# Patient Record
Sex: FEMALE | Race: WHITE | NOT HISPANIC OR LATINO | URBAN - METROPOLITAN AREA
[De-identification: names, ages, dates, MRNs, and addresses within clinical notes are randomized per-mention and may not be internally consistent; named-entity substitution may affect disease eponyms.]

---

## 2018-10-24 PROCEDURE — 88305 TISSUE EXAM BY PATHOLOGIST: CPT | Performed by: PATHOLOGY

## 2018-10-25 ENCOUNTER — LAB REQUISITION (OUTPATIENT)
Dept: LAB | Facility: HOSPITAL | Age: 33
End: 2018-10-25
Payer: COMMERCIAL

## 2018-10-25 DIAGNOSIS — N93.8 OTHER SPECIFIED ABNORMAL UTERINE AND VAGINAL BLEEDING: ICD-10-CM

## 2021-11-04 ENCOUNTER — APPOINTMENT (OUTPATIENT)
Dept: URGENT CARE | Facility: CLINIC | Age: 36
End: 2021-11-04

## 2021-11-04 DIAGNOSIS — Z02.1 PHYSICAL EXAM, PRE-EMPLOYMENT: ICD-10-CM

## 2021-11-04 PROCEDURE — 86480 TB TEST CELL IMMUN MEASURE: CPT

## 2021-11-04 PROCEDURE — 86787 VARICELLA-ZOSTER ANTIBODY: CPT

## 2021-11-05 LAB
GAMMA INTERFERON BACKGROUND BLD IA-ACNC: 0.05 IU/ML
M TB IFN-G BLD-IMP: NEGATIVE
M TB IFN-G CD4+ BCKGRND COR BLD-ACNC: -0.01 IU/ML
M TB IFN-G CD4+ BCKGRND COR BLD-ACNC: -0.01 IU/ML
MITOGEN IGNF BCKGRD COR BLD-ACNC: >10 IU/ML

## 2021-11-08 LAB — VZV IGG SER IA-ACNC: NORMAL

## 2023-12-15 ENCOUNTER — COSMETIC (OUTPATIENT)
Dept: PLASTIC SURGERY | Facility: CLINIC | Age: 38
End: 2023-12-15

## 2023-12-15 DIAGNOSIS — Z41.1 ENCOUNTER FOR COSMETIC PROCEDURE: Primary | ICD-10-CM

## 2023-12-15 PROCEDURE — BOTOX1U PR BOTOX BY THE UNIT: Performed by: STUDENT IN AN ORGANIZED HEALTH CARE EDUCATION/TRAINING PROGRAM

## 2023-12-15 PROCEDURE — BOTOX1 ONE AREA OR 25 UNITS: Performed by: STUDENT IN AN ORGANIZED HEALTH CARE EDUCATION/TRAINING PROGRAM

## 2023-12-15 NOTE — PROGRESS NOTES
Botox Consult     First time?: no, had with OSH provider  Allergies: no  Blood thinners: no   Pregnant: no  Neuromuscular conditions: no    Patient has had botox, interested in relaxing orbicularis oris muscle for smokers lines. Will proceed with 4 units of botox     Risks and benefits discussed, patient agreed to proceed.      4 units to superior orbicularis oris muscle     Total 4 units, 30% off employee     Patient tolerated well, f/u in 3 months        Javy Garrido MD   [address] [facility]   [facility], [facility] [address]   Office: [phone]

## 2023-12-20 DIAGNOSIS — Z00.6 ENCOUNTER FOR EXAMINATION FOR NORMAL COMPARISON OR CONTROL IN CLINICAL RESEARCH PROGRAM: ICD-10-CM

## 2023-12-21 ENCOUNTER — APPOINTMENT (OUTPATIENT)
Dept: LAB | Facility: CLINIC | Age: 38
End: 2023-12-21

## 2023-12-21 DIAGNOSIS — Z00.6 ENCOUNTER FOR EXAMINATION FOR NORMAL COMPARISON OR CONTROL IN CLINICAL RESEARCH PROGRAM: ICD-10-CM

## 2023-12-21 PROCEDURE — 36415 COLL VENOUS BLD VENIPUNCTURE: CPT

## 2024-02-12 LAB
APOB+LDLR+PCSK9 GENE MUT ANL BLD/T: NOT DETECTED
BRCA1+BRCA2 DEL+DUP + FULL MUT ANL BLD/T: NOT DETECTED
MLH1+MSH2+MSH6+PMS2 GN DEL+DUP+FUL M: NOT DETECTED

## 2024-10-16 ENCOUNTER — EVALUATION (OUTPATIENT)
Dept: PHYSICAL THERAPY | Facility: CLINIC | Age: 39
End: 2024-10-16
Payer: COMMERCIAL

## 2024-10-16 DIAGNOSIS — M53.3 COCCYX PAIN: Primary | ICD-10-CM

## 2024-10-16 PROCEDURE — 97530 THERAPEUTIC ACTIVITIES: CPT | Performed by: PHYSICAL THERAPIST

## 2024-10-16 PROCEDURE — 97163 PT EVAL HIGH COMPLEX 45 MIN: CPT | Performed by: PHYSICAL THERAPIST

## 2024-10-16 NOTE — PROGRESS NOTES
PT Evaluation     Today's date: 10/16/2024  Patient name: Rimma Leyva  : 1985  MRN: [id number]  Referring provider: Batsheva Scott PA-C  Dx:   Encounter Diagnosis     ICD-10-CM    1. Coccyx pain  M53.3                      Assessment  Impairments: activity intolerance, impaired physical strength, lacks appropriate home exercise program, pain with function, poor body mechanics, unable to perform ADL, activity limitations and endurance  Symptom irritability: high  Understanding of Dx/Px/POC: good     Prognosis: good    Goals  (Up to 8 weeks)  1. Independent and safe with HEP.  2. Independent and safe with self-stretching tech.  3. Independent and safe with self-pain management.  4. PF MMT will increase by 1/2 grade t/o.    Plan  Patient would benefit from: skilled physical therapy    Planned therapy interventions: joint mobilization, manual therapy, massage, neuromuscular re-education, patient/caregiver education, postural training, strengthening, body mechanics training, behavior modification, activity modification, abdominal trunk stabilization, home exercise program, graded exercise, graded activity and flexibility    Frequency: 1x week  Duration in weeks: 8  Treatment plan discussed with: patient        PT Pelvic Floor Subjective:   History of Present Illness:   Pt is 40 y/o female with c/o coccyx pain since May 2024. She was seen by her pain management specialist, received 2 injections with no improvement in symptoms. (-) X-ray, (-) MRI. Also, CLBP, Hx of fall on ice in , (+) hysterectomy in .  Current functional limitations: pain with prolong sitting, pain with palpation over tailbone, some discomfort during the night as well, some discomfort with prolong amb and pain with sit to stand transfer.Date of onset: 2024          Recurrent probem    Quality of life: fair    Social Support:     Lives in:  Multiple-level home    Lives with:  Spouse and young children    Relationship status:  "/committed    Work status: employed full time (prolong sitting)    Life stress level: 8    Life stress severity: severe    History of Depression: noPronouns: she/her  Hand dominance:  Right  Diet and Exercise:    Diet:balanced nutrition    Exercise type: yoga and combination activity    Exercise frequency: 3-4 times per week  OB/ gyn History    Gestational History:     Prior Pregnancy: Yes      Number of prior pregnancies: 1    Number of term pregnancies: 1    Delivery Type:  section      Number of caesarian sections: 1 (, 8.3 lbs)    Delivery Complications:  Emergency .  no delivery complications    Menstrual History:    no hormone replacement therapy  Hx of hysterectomy in   Bladder Function:     Voiding Difficulties positive for: urgency and frequent urination      Voiding Difficulties negative for: incomplete emptying       Voiding Difficulties comments:     Voiding frequency: every 1-2 hours and every 3-4 hours    Urinary leakage: no urine leakage    Urinary leakage not aggravated by: post-void dribble    Nocturia (episodes per night): 0 and 1    Painful urination: No      Fluid Intake Type:  Water, coffee, tea, alcohol and soda    Intake (ounces): Water intake (oz): 32 oz. Coffee intake (oz): 16 oz. Soda intake (oz): 1 mini can per day. Tea intake (oz): on/off. Alcohol intake (oz): on/off.  Incontinence Management:     Pads/Diaper Use:  None  Bowel Function:     Voiding DIfficulties: unfinished feeling after defecating and constipation      Bowel Function comments:  Educated on postural education with home use of squatty potty to improve BM.    Bowel frequency: daily, every 2 days and every 3 days    Avery Stool Scale: type 5, type 6, type 4, type 1 and type 2    Stool softener use: stool softeners    Enema use: no enema    Uses \"squatty potty\": no Squatty Potty  Sexual Function:     Sexually Active:  Non-contributory  Pain:     Current pain ratin    At best pain rating:  " 3    At worst pain rating:  10    Onset:  4-6 months ago    Quality:  Knife-like and sharp    Aggravating factors:  Prolonged positions (in sitting)    Duration of symptoms:  Does not go away    Relieving factors:  Change in position    Progression:  No change  Diagnostic Tests:     X-ray: normal      MRI studies: normal    Treatments:     Previous treatment:  Medication    Current treatment: injection treatment    Patient Goals:     Patient goals for therapy:  Improved quality of life, improved sleep, improved pain management, decreased pain and improved comfort    Other patient goals:  To improve sitting without pain.      Objective     Postural Observations  Seated posture: poor  Standing posture: poor    Additional Postural Observation Details  (+) coccyx pillow use    (+) L-roll use    Active Range of Motion     Lumbar   Normal active range of motion    Strength/Myotome Testing     Lumbar   Left   Normal strength    Right   Normal strength    General Comments:      Lumbar Comments  (+) R shift of coccyx noted, (+) flexion palpated, (+) external and internal discomfort to palpation over coccyx and to soft tissue b/l to coccyx/deep PFM.      Abdominal Assessment:      Position: supine exam    Diastatis   Diastasis recti present? no  Connective tissue integrity at linea alba: firm  no tenderness at linea alba  able to engage transverse abdominis     Skin inspection:   no scars present.       General Perineum Exam:   perineum intact.     Visual Inspection of Perineum:   Excursion of perineal body in cephalad direction with contraction of pelvic floor muscles (PFM): fair   Excursion of perineal body in caudal direction with relaxation of pelvic floor muscles (PFM): good   Involuntary contraction with coughing: no  Involuntary relaxation with bearing down: no  Cotton swab test: non-tender  Cough reflex: cough reflex  Sphincter Tone Resting: normal  Sphincter Tone Squeeze: normal  Sensation: intact  Tenderness:  unprovoked    Pelvic Organ Prolapse   no pelvic organ prolapse  Perineal body inspection: within normal limits        Pelvic Floor Muscle Exam:      Breathing pattern with contraction: holding breath   Pelvic floor muscle relaxation is complete.         PERFECT Score   Power right: 3/5   Power left: 3/5   Endurance (seconds to max): 5   Repetitions (before fatigue): 5   Fast flicks (in 10 seconds): 5      Rectal Pelvic Floor Muscle Exam    External anal sphincter: wink reflex intact    pelvic floor exam consent given by patient    Pelvic exam completed: vaginally and rectally                Precautions: not any given, see pt's chart for details      Manuals 10/16            PF MMT 3/5 w good endurance            Internal external coccyx mobs                                       Neuro Re-Ed                                                                                                        Ther Ex             Sitting self coccyx stretch w use of tennis ball demo            U/l piriformis stretch demo                                                                                          Ther Activity             HEP edu/review 8'            Pelvic wand use/benefits/purpose 2' edu            Gait Training                                       Modalities

## 2024-10-16 NOTE — LETTER
2024      No Recipients    Patient: Rimma Leyva   YOB: 1985   Date of Visit: 10/16/2024     Encounter Diagnosis     ICD-10-CM    1. Coccyx pain  M53.3           Dear Dr. Scott:    Thank you for your recent referral of Rimma Leyva. Please review the attached evaluation summary from Rimma's recent visit.     Please verify that you agree with the plan of care by signing the attached order.     If you have any questions or concerns, please do not hesitate to call.     I sincerely appreciate the opportunity to share in the care of one of your patients and hope to have another opportunity to work with you in the near future.       Sincerely,    Keri Crespo, PT      Referring Provider:      I certify that I have read the below Plan of Care and certify the need for these services furnished under this plan of treatment while under my care.                    Batsheva Scott PA-C  [address] [place] [address]  [address] [place] [address]  Via Fax: [phone]          PT Evaluation     Today's date: 10/16/2024  Patient name: Rimma Leyva  : 1985  MRN: [id number]  Referring provider: Batsheva Scott PA-C  Dx:   Encounter Diagnosis     ICD-10-CM    1. Coccyx pain  M53.3                      Assessment/Plan    PT Pelvic Floor Subjective:   History of Present Illness:   Pt is 40 y/o female with c/o coccyx pain since May 2024. She was seen by her pain management specialist, received 2 injections with no improvement in symptoms. (-) X-ray, (-) MRI. Also, CLBP, Hx of fall on ice in   Current functional limitations: pain with prolong sitting, pain with palpation over tailbone, some discomfort during the night as well, some discomfort with prolong amb and pain with sit to stand transfer.    PMHx:  hysterectomy Date of onset: 2024          Recurrent probem    Quality of life: fair    Social Support:     Lives in:  Multiple-level home    Lives with:  Spouse and young children     "Relationship status: /committed    Work status: employed full time (prolong sitting)    Life stress level: 8    Life stress severity: severe    History of Depression: noPronouns: she/her  Hand dominance:  Right  Diet and Exercise:    Diet:balanced nutrition    Exercise type: yoga and combination activity    Exercise frequency: 3-4 times per week  OB/ gyn History    Gestational History:     Prior Pregnancy: Yes      Number of prior pregnancies: 1    Number of term pregnancies: 1    Delivery Type:  section      Number of caesarian sections: 1 (, 8.3 lbs)    Delivery Complications:  Emergency .  no delivery complications    Menstrual History:    no hormone replacement therapy  Hx of hysterectomy in   Bladder Function:     Voiding Difficulties positive for: urgency and frequent urination      Voiding Difficulties negative for: incomplete emptying       Voiding Difficulties comments:     Voiding frequency: every 1-2 hours and every 3-4 hours    Urinary leakage: no urine leakage    Urinary leakage not aggravated by: post-void dribble    Nocturia (episodes per night): 0 and 1    Painful urination: No      Fluid Intake Type:  Water, coffee, tea, alcohol and soda    Intake (ounces): Water intake (oz): 32 oz. Coffee intake (oz): 16 oz. Soda intake (oz): 1 mini can per day. Tea intake (oz): on/off. Alcohol intake (oz): on/off.  Incontinence Management:     Pads/Diaper Use:  None  Bowel Function:     Voiding DIfficulties: unfinished feeling after defecating and constipation      Bowel Function comments:  Educated on postural education with home use of squatty potty to improve BM.    Bowel frequency: daily, every 2 days and every 3 days    [place] Stool Scale: type 5, type 6, type 4, type 1 and type 2    Stool softener use: stool softeners    Enema use: no enema    Uses \"squatty potty\": no Squatty Potty  Sexual Function:     Sexually Active:  Non-contributory  Pain:     Current pain ratin    " At best pain rating:  3    At worst pain rating:  10    Onset:  4-6 months ago    Quality:  Knife-like and sharp    Aggravating factors:  Prolonged positions (in sitting)    Duration of symptoms:  Does not go away    Relieving factors:  Change in position    Progression:  No change  Diagnostic Tests:     X-ray: normal      MRI studies: normal    Treatments:     Previous treatment:  Medication    Current treatment: injection treatment    Patient Goals:     Patient goals for therapy:  Improved quality of life, improved sleep, improved pain management, decreased pain and improved comfort    Other patient goals:  To improve sitting without pain.      Objective           Precautions: not any given, see pt's chart for details      Manuals 10/16                                                                Neuro Re-Ed                                                                                                        Ther Ex                                                                                                                     Ther Activity                                       Gait Training                                       Modalities

## 2024-10-23 ENCOUNTER — OFFICE VISIT (OUTPATIENT)
Dept: PHYSICAL THERAPY | Facility: CLINIC | Age: 39
End: 2024-10-23
Payer: COMMERCIAL

## 2024-10-23 DIAGNOSIS — M53.3 COCCYX PAIN: Primary | ICD-10-CM

## 2024-10-23 PROCEDURE — 97110 THERAPEUTIC EXERCISES: CPT | Performed by: PHYSICAL THERAPIST

## 2024-10-23 PROCEDURE — 97140 MANUAL THERAPY 1/> REGIONS: CPT | Performed by: PHYSICAL THERAPIST

## 2024-10-23 NOTE — PROGRESS NOTES
"Daily Note     Today's date: 10/23/2024  Patient name: Rimma Leyva  : 1985  MRN: [id number]  Referring provider: Batsheva Scott PA-C  Dx:   Encounter Diagnosis     ICD-10-CM    1. Coccyx pain  M53.3                      Subjective: No new changes since IE. Used a tennis ball at home for self stretch.      Objective: See treatment diary below      Assessment: Tolerated treatment well. Patient would benefit from continued PT for further manual tx. HEP for self stretching tech updated and reviewed. No worsening of pain post tx.      Plan: Continue per plan of care.  Progress treatment as tolerated.       Precautions: not any given, see pt's chart for details      Manuals 10/16 10/23           PF MMT 3/5 w good endurance            Internal external coccyx mobs  SZ external           Manual glut TPR  SZ                        Neuro Re-Ed                                                                                                        Ther Ex             Sitting self coccyx stretch w use of tennis ball demo            U/l piriformis stretch demo 3x30\"           Butterfly stretch  3x30\"           Child pose stretch  3x30\"           Palmer pose stretch  1x30\"                                                  Ther Activity             HEP edu/review 8' review           Pelvic wand use/benefits/purpose 2' edu            Gait Training                                       Modalities             MH to pelvis  applied                             "

## 2024-10-29 ENCOUNTER — OFFICE VISIT (OUTPATIENT)
Dept: PHYSICAL THERAPY | Facility: CLINIC | Age: 39
End: 2024-10-29
Payer: COMMERCIAL

## 2024-10-29 DIAGNOSIS — M53.3 COCCYX PAIN: Primary | ICD-10-CM

## 2024-10-29 PROCEDURE — 97110 THERAPEUTIC EXERCISES: CPT | Performed by: PHYSICAL THERAPIST

## 2024-10-29 PROCEDURE — 97140 MANUAL THERAPY 1/> REGIONS: CPT | Performed by: PHYSICAL THERAPIST

## 2024-10-29 NOTE — PROGRESS NOTES
"Daily Note     Today's date: 10/29/2024  Patient name: Rimma Leyva  : 1985  MRN: [id number]  Referring provider: Batsheva Scott PA-C  Dx:   Encounter Diagnosis     ICD-10-CM    1. Coccyx pain  M53.3                      Subjective: No change in symptoms, c/o same coccyx pain. Performing self stretching tech daily.      Objective: See treatment diary below      Assessment: Tolerated treatment fair. Patient is not able to benjamin external manual tx due to discomfort in R coccyx region to palpation. Use of LB/coccyx taping during tx session. No worsening of symptoms post tx, no pain in standing with tape in place.       Plan: SI assessment next tx sessio.     Precautions: not any given, see pt's chart for details      Manuals 10/16 10/23 10/29          PF MMT 3/5 w good endurance            Internal external coccyx mobs  SZ external SZ/terminated today/pain          Manual glut TPR  SZ           Coccyx/LB taping   SZ          Neuro Re-Ed                                                                                                        Ther Ex             Sitting self coccyx stretch w use of tennis ball demo  hold          U/l piriformis stretch demo 3x30\" 3x30\"          Butterfly stretch  3x30\" review          Child pose stretch  3x30\" review          [place] pose stretch  1x30\" review                                                 Ther Activity             HEP edu/review 8' review           Pelvic wand use/benefits/purpose 2' edu            Gait Training                                       Modalities             MH to pelvis  applied applied                              "

## 2024-10-31 ENCOUNTER — OFFICE VISIT (OUTPATIENT)
Dept: PHYSICAL THERAPY | Facility: CLINIC | Age: 39
End: 2024-10-31
Payer: COMMERCIAL

## 2024-10-31 DIAGNOSIS — M53.3 COCCYX PAIN: Primary | ICD-10-CM

## 2024-10-31 PROCEDURE — 97140 MANUAL THERAPY 1/> REGIONS: CPT | Performed by: PHYSICAL THERAPIST

## 2024-10-31 PROCEDURE — 97112 NEUROMUSCULAR REEDUCATION: CPT | Performed by: PHYSICAL THERAPIST

## 2024-10-31 NOTE — PROGRESS NOTES
"Daily Note     Today's date: 10/31/2024  Patient name: Rimma Leyva  : 1985  MRN: [id number]  Referring provider: Batsheva Scott PA-C  Dx:   Encounter Diagnosis     ICD-10-CM    1. Coccyx pain  M53.3                      Subjective: Pt reports no change with taping.       Objective: See treatment diary below      Assessment: Right backward sacral torsion. No asymmetry with long sit test. B hip abd/add/ext strength 4/5. Good recruitment but poor endurance of TA.       Plan: Continue per plan of care.      Precautions: not any given, see pt's chart for details      Manuals 10/16 10/23 10/29 10/31         PF MMT 3/5 w good endurance            Internal external coccyx mobs  SZ external SZ/terminated today/pain          Manual glut TPR  SZ           Coccyx/LB taping   SZ          R sacral base rocking    KT         R BST MET    KT         Neuro Re-Ed             TA    5\"x20         TA + hip add/abd/ext SLR    To fatigue B (15-20x)         TA + donkey kick    20x ea                                                             Ther Ex             Sitting self coccyx stretch w use of tennis ball demo  hold          U/l piriformis stretch demo 3x30\" 3x30\" 3x30\"         Butterfly stretch  3x30\" review          Child pose stretch  3x30\" review          [place] pose stretch  1x30\" review                                                 Ther Activity             HEP edu/review 8' review           Pelvic wand use/benefits/purpose 2' edu            Gait Training                                       Modalities             MH to pelvis  applied applied                                "

## 2024-11-05 ENCOUNTER — OFFICE VISIT (OUTPATIENT)
Dept: PHYSICAL THERAPY | Facility: CLINIC | Age: 39
End: 2024-11-05
Payer: COMMERCIAL

## 2024-11-05 DIAGNOSIS — M53.3 COCCYX PAIN: Primary | ICD-10-CM

## 2024-11-05 PROCEDURE — 97530 THERAPEUTIC ACTIVITIES: CPT | Performed by: PHYSICAL THERAPIST

## 2024-11-05 PROCEDURE — 97112 NEUROMUSCULAR REEDUCATION: CPT | Performed by: PHYSICAL THERAPIST

## 2024-11-05 PROCEDURE — 97110 THERAPEUTIC EXERCISES: CPT | Performed by: PHYSICAL THERAPIST

## 2024-11-05 NOTE — PROGRESS NOTES
"Daily Note     Today's date: 2024  Patient name: Rimma Leyva  : 1985  MRN: [id number]  Referring provider: Batsheva Scott PA-C  Dx:   Encounter Diagnosis     ICD-10-CM    1. Coccyx pain  M53.3                      Subjective: No changes in symptoms with SI or PF HEP verbalized. Pt c/o same continues pain with prolong sitting/ transfers from sit to stand, no position of comfort, (+) use of seat cushion, (+) use of hear for comfort, (+) self stretching daily. Pt also report stabbing pain with SI HEP.    Objective: See treatment diary below      Assessment: Tolerated treatment well. Patient exhibited good technique with therapeutic exercises and self stretching tech. No pain during the tx verbalized. Safe and fully independent with HEP. Pt was advised to hold off on any painful HEP and reach out to her doctor for further recommendations of tx.       Plan: Self D/C from PT - no change in symptoms.     Precautions: not any given, see pt's chart for details      Manuals 10/16 10/23 10/29 10/31 11/5        PF MMT 3/5 w good endurance    Same/declined re-assessment        Internal external coccyx mobs  SZ external SZ/terminated today/pain          Manual glut TPR  SZ           Coccyx/LB taping   SZ          R sacral base rocking    KT         R BST MET    KT         Neuro Re-Ed             TA    5\"x20         TA + hip add/abd/ext SLR    To fatigue B (15-20x)         TA + donkey kick    20x ea                      T'dmill amb/posture/PF     10' at 2.5 mph                                  Ther Ex             Sitting self coccyx stretch w use of tennis ball demo  hold          U/l piriformis stretch demo 3x30\" 3x30\" 3x30\" 3x30\"        Butterfly stretch  3x30\" review  3x30\" review        Child pose stretch  3x30\" review  5x30\"        Spencer pose stretch  1x30\" review  3x30\" ea LE        Cat/cow/PF/TA     5x5\"                                  Ther Activity             HEP edu/review 8' review   8' reviewed all     "    Pelvic wand use/benefits/purpose 2' edu            Gait Training                                       Modalities             MH to pelvis  applied applied

## 2024-11-13 ENCOUNTER — APPOINTMENT (OUTPATIENT)
Dept: PHYSICAL THERAPY | Facility: CLINIC | Age: 39
End: 2024-11-13
Payer: COMMERCIAL

## 2024-11-22 PROBLEM — M47.816 LUMBAR SPONDYLOSIS: Status: ACTIVE | Noted: 2024-11-22

## 2024-11-22 NOTE — ASSESSMENT & PLAN NOTE
New evaluation of sacral pain  **? Back pain  Follows with outside pain management team and has undergone LELO/RFA in the past. Does HEP  No recent lumbar spine MRI    Imaging:  MRI sacrum 8/2024: Normal MR examination of the sacrum/coccyx.   XR sacrum/coccyx 7/2024: Findings/impression: Overlying stool and gas limits evaluation.   No acute fracture or malalignment. No lytic or blastic lesions.     Plan:

## 2024-11-26 ENCOUNTER — OFFICE VISIT (OUTPATIENT)
Dept: OBGYN CLINIC | Facility: OTHER | Age: 39
End: 2024-11-26
Payer: COMMERCIAL

## 2024-11-26 ENCOUNTER — OFFICE VISIT (OUTPATIENT)
Dept: NEUROSURGERY | Facility: CLINIC | Age: 39
End: 2024-11-26
Payer: COMMERCIAL

## 2024-11-26 VITALS
OXYGEN SATURATION: 99 % | BODY MASS INDEX: 24.8 KG/M2 | RESPIRATION RATE: 18 BRPM | HEIGHT: 63 IN | WEIGHT: 140 LBS | DIASTOLIC BLOOD PRESSURE: 68 MMHG | TEMPERATURE: 97.9 F | SYSTOLIC BLOOD PRESSURE: 110 MMHG | HEART RATE: 90 BPM

## 2024-11-26 DIAGNOSIS — M53.3 PAIN IN THE COCCYX: ICD-10-CM

## 2024-11-26 DIAGNOSIS — M53.3 COCCYX PAIN: ICD-10-CM

## 2024-11-26 DIAGNOSIS — M47.816 LUMBAR SPONDYLOSIS: Primary | ICD-10-CM

## 2024-11-26 DIAGNOSIS — M53.3 COCCYXDYNIA: Primary | ICD-10-CM

## 2024-11-26 PROCEDURE — 99203 OFFICE O/P NEW LOW 30 MIN: CPT | Performed by: FAMILY MEDICINE

## 2024-11-26 PROCEDURE — 99203 OFFICE O/P NEW LOW 30 MIN: CPT | Performed by: PHYSICIAN ASSISTANT

## 2024-11-26 RX ORDER — LANSOPRAZOLE 30 MG/1
30 CAPSULE, DELAYED RELEASE ORAL DAILY
COMMUNITY
Start: 2024-10-14

## 2024-11-26 RX ORDER — SEMAGLUTIDE 1.7 MG/.75ML
1.7 INJECTION, SOLUTION SUBCUTANEOUS WEEKLY
COMMUNITY
Start: 2024-10-15

## 2024-11-26 RX ORDER — TRAMADOL HYDROCHLORIDE 50 MG/1
50 TABLET ORAL EVERY 6 HOURS PRN
COMMUNITY
Start: 2024-11-12

## 2024-11-26 RX ORDER — CELECOXIB 200 MG/1
200 CAPSULE ORAL DAILY
COMMUNITY
Start: 2024-11-14
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
## 2024-11-27 NOTE — PATIENT INSTRUCTIONS
- Patient has already completed conservative therapy from our evaluation of additional modalities up to her appointment today.  - Provided referral to follow-up with orthopedic spine surgery for further evaluation of coccyx pain and potential surgical intervention as patient has already exhausted conservative management techniques for control of lower back pain.

## 2024-12-09 ENCOUNTER — OFFICE VISIT (OUTPATIENT)
Dept: OBGYN CLINIC | Facility: HOSPITAL | Age: 39
End: 2024-12-09
Payer: COMMERCIAL

## 2024-12-09 VITALS — BODY MASS INDEX: 24.8 KG/M2 | HEIGHT: 63 IN | WEIGHT: 139.99 LBS

## 2024-12-09 DIAGNOSIS — M53.3 PAIN IN THE COCCYX: ICD-10-CM

## 2024-12-09 DIAGNOSIS — M53.3 COCCYX PAIN: ICD-10-CM

## 2024-12-09 PROCEDURE — 99204 OFFICE O/P NEW MOD 45 MIN: CPT | Performed by: ORTHOPAEDIC SURGERY

## 2024-12-09 RX ORDER — OMEGA-3 FATTY ACIDS/FISH OIL 300-1000MG
CAPSULE ORAL
COMMUNITY
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
## 2025-01-09 ENCOUNTER — CONSULT (OUTPATIENT)
Age: 40
End: 2025-01-09
Payer: COMMERCIAL

## 2025-01-09 VITALS — HEIGHT: 63 IN | BODY MASS INDEX: 25.52 KG/M2 | WEIGHT: 144 LBS

## 2025-01-09 DIAGNOSIS — M53.3 PAIN IN THE COCCYX: ICD-10-CM

## 2025-01-09 DIAGNOSIS — M53.3 COCCYX PAIN: ICD-10-CM

## 2025-01-09 PROCEDURE — 99244 OFF/OP CNSLTJ NEW/EST MOD 40: CPT | Performed by: ANESTHESIOLOGY

## 2025-01-09 NOTE — PROGRESS NOTES
Assessment:  1. Coccyx pain    2. Pain in the coccyx        Plan:  Patient is a 39-year-old female complains of tailbone pain for chronic pain syndrome secondary to coccydynia presents to office for follow-up visit.  MRI sacrum within normal limits.  Patient has point tenderness to the coccyx/tailbone.  Patient had a ganglion impar block and a caudal epidural steroid injection without any alleviation of symptoms.  Patient did physical therapy pelvic floor strengthening.  I am at this point all the conservative therapy has been exhausted therefore we will trial interventional management.  1.  We will schedule patient for a coccygeal ligament and coccygeal joint steroid injection  2.  Follow-up 1 month after injection  3.  Patient does have pain with standing for set up assistance and stand position and pain with sitting position will consider a sacroiliac joint steroid injection if patient does not get alleviation from this procedure.      Complete risks and benefits including bleeding, infection, tissue reaction, nerve injury and allergic reaction were discussed. The approach was demonstrated using models and literature was provided. Verbal and written consent was obtained.      History of Present Illness:    The patient is a 39 y.o. female who presents for consultation in regards to Back Pain.  Symptoms have been present for 7 months. Symptoms began without any precipitating injury or trauma. Pain is reported to be 10 on the numeric rating scale.  Symptoms are felt nearly constantly and worst in the no typical pattern.  Symptoms are characterized as burning, shooting, sharp, and cutting.  Symptoms are associated with no weakness.  Aggravating factors include bending, leaning forward, leaning bckward, sitting, exercise, and relaxation.  Relieving factors include lying down.  No change in symptoms with kneeling, standing, walking, coughing/sneezing, and bowel movements.  Treatments that have been helpful include  nothing. prior injections including Caudal, Ganglion Impar, physical therapy, chiropractic manipulation, home exercise, and heat/ice have provided no relief.  Medications to relieve symptoms include ibuprofen.    Review of Systems:    Review of Systems   Constitutional:  Negative for chills and fever.   HENT:  Negative for ear pain and sore throat.    Eyes:  Negative for pain and visual disturbance.   Respiratory:  Negative for cough and shortness of breath.    Cardiovascular:  Negative for chest pain and palpitations.   Gastrointestinal:  Negative for abdominal pain and vomiting.   Genitourinary:  Negative for dysuria and hematuria.   Musculoskeletal:  Positive for arthralgias and myalgias. Negative for back pain.   Skin:  Negative for color change and rash.   Neurological:  Negative for seizures and syncope.   All other systems reviewed and are negative.        History reviewed. No pertinent past medical history.    History reviewed. No pertinent surgical history.    History reviewed. No pertinent family history.    Social History     Occupational History    Not on file   Tobacco Use    Smoking status: Every Day     Current packs/day: 0.25     Average packs/day: 0.3 packs/day for 20.0 years (5.0 ttl pk-yrs)     Types: Cigarettes     Start date: 2005    Smokeless tobacco: Never   Vaping Use    Vaping status: Never Used   Substance and Sexual Activity    Alcohol use: Yes     Comment: social    Drug use: Yes     Types: Marijuana     Comment: 1-2x/wk    Sexual activity: Not on file         Current Outpatient Medications:     celecoxib (CeleBREX) 200 mg capsule, Take 200 mg by mouth daily, Disp: , Rfl:     Ibuprofen (Advil) 200 MG CAPS, Take by mouth, Disp: , Rfl:     lansoprazole (PREVACID) 30 mg capsule, Take 30 mg by mouth daily, Disp: , Rfl:     Multiple Vitamin (MULTIVITAMIN ADULT PO), Take 1 tablet by mouth, Disp: , Rfl:     Wegovy 1.7 MG/0.75ML, Inject 1.7 mg under the skin Once a week, Disp: , Rfl:     traMADol  "(ULTRAM) 50 mg tablet, Take 50 mg by mouth every 6 (six) hours as needed, Disp: , Rfl:     Allergies   Allergen Reactions    Lactose - Food Allergy Diarrhea     Abdominal bloating    Benzoyl Peroxide Hives, Itching and Rash    Povidone Iodine Hives, Itching and Rash       Physical Exam:    Ht 5' 3\" (1.6 m)   Wt 65.3 kg (144 lb)   BMI 25.51 kg/m²     Constitutional: normal, well developed, well nourished, alert, in no distress and non-toxic and no overt pain behavior.  Eyes: anicteric  HEENT: grossly intact  Neck: supple, symmetric, trachea midline and no masses   Pulmonary:even and unlabored  Cardiovascular:No edema or pitting edema present  Skin:Normal without rashes or lesions and well hydrated  Psychiatric:Mood and affect appropriate  Neurologic:Cranial Nerves II-XII grossly intact  Musculoskeletal:antalgic; pain with palpation of the coccyx    Imaging    Impression    Impression: Normal MR examination of the sacrum/coccyx.          Workstation:[id number]  Narrative    History: M 53.3    Technique: Small field-of-view thin section coronal T1, sagittal T1, axial T1,  axial T2, axial fat-suppressed T2, and coronal STIR sequences of the sacrum were  obtained. There are no pertinent prior studies.      Previous x-ray 7/1/2024 and lumbar MRI 12/16/2022      Results:  The bone marrow signal intensity is normal. The sacroiliac joints appear normal  bilaterally. There is no bone marrow edema. There is no bone marrow  infiltration. There is no fracture. .     There is evidence of mass, fluid, inflammatory change, or lymphadenopathy. The  adjacent neurovascular structures appear normal. No presacral mass nor cyst  there is been hysterectomy.    Impression    Findings/impression: Overlying stool and gas limits evaluation.  No acute fracture or malalignment. No lytic or blastic lesions.            Workstation:[id number]  Narrative    History: Coccyx pain    Study: XR SACRUM AND/OR COCCYX    Comparison: None  Exam End: 07/01/24 " 12:32 PM    Specimen Collected: 07/01/24  1:50 PM Last Resulted: 07/01/24  1:50 PM   Received From: [facility]  Result Received: 10/16/24 12:55 PM           Orders Placed This Encounter   Procedures    [place] spine and [facility] procedure

## 2025-01-09 NOTE — H&P (VIEW-ONLY)
Assessment:  1. Coccyx pain    2. Pain in the coccyx        Plan:  Patient is a 39-year-old female complains of tailbone pain for chronic pain syndrome secondary to coccydynia presents to office for follow-up visit.  MRI sacrum within normal limits.  Patient has point tenderness to the coccyx/tailbone.  Patient had a ganglion impar block and a caudal epidural steroid injection without any alleviation of symptoms.  Patient did physical therapy pelvic floor strengthening.  I am at this point all the conservative therapy has been exhausted therefore we will trial interventional management.  1.  We will schedule patient for a coccygeal ligament and coccygeal joint steroid injection  2.  Follow-up 1 month after injection  3.  Patient does have pain with standing for set up assistance and stand position and pain with sitting position will consider a sacroiliac joint steroid injection if patient does not get alleviation from this procedure.      Complete risks and benefits including bleeding, infection, tissue reaction, nerve injury and allergic reaction were discussed. The approach was demonstrated using models and literature was provided. Verbal and written consent was obtained.      History of Present Illness:    The patient is a 39 y.o. female who presents for consultation in regards to Back Pain.  Symptoms have been present for 7 months. Symptoms began without any precipitating injury or trauma. Pain is reported to be 10 on the numeric rating scale.  Symptoms are felt nearly constantly and worst in the no typical pattern.  Symptoms are characterized as burning, shooting, sharp, and cutting.  Symptoms are associated with no weakness.  Aggravating factors include bending, leaning forward, leaning bckward, sitting, exercise, and relaxation.  Relieving factors include lying down.  No change in symptoms with kneeling, standing, walking, coughing/sneezing, and bowel movements.  Treatments that have been helpful include  nothing. prior injections including Caudal, Ganglion Impar, physical therapy, chiropractic manipulation, home exercise, and heat/ice have provided no relief.  Medications to relieve symptoms include ibuprofen.    Review of Systems:    Review of Systems   Constitutional:  Negative for chills and fever.   HENT:  Negative for ear pain and sore throat.    Eyes:  Negative for pain and visual disturbance.   Respiratory:  Negative for cough and shortness of breath.    Cardiovascular:  Negative for chest pain and palpitations.   Gastrointestinal:  Negative for abdominal pain and vomiting.   Genitourinary:  Negative for dysuria and hematuria.   Musculoskeletal:  Positive for arthralgias and myalgias. Negative for back pain.   Skin:  Negative for color change and rash.   Neurological:  Negative for seizures and syncope.   All other systems reviewed and are negative.        History reviewed. No pertinent past medical history.    History reviewed. No pertinent surgical history.    History reviewed. No pertinent family history.    Social History     Occupational History    Not on file   Tobacco Use    Smoking status: Every Day     Current packs/day: 0.25     Average packs/day: 0.3 packs/day for 20.0 years (5.0 ttl pk-yrs)     Types: Cigarettes     Start date: 2005    Smokeless tobacco: Never   Vaping Use    Vaping status: Never Used   Substance and Sexual Activity    Alcohol use: Yes     Comment: social    Drug use: Yes     Types: Marijuana     Comment: 1-2x/wk    Sexual activity: Not on file         Current Outpatient Medications:     celecoxib (CeleBREX) 200 mg capsule, Take 200 mg by mouth daily, Disp: , Rfl:     Ibuprofen (Advil) 200 MG CAPS, Take by mouth, Disp: , Rfl:     lansoprazole (PREVACID) 30 mg capsule, Take 30 mg by mouth daily, Disp: , Rfl:     Multiple Vitamin (MULTIVITAMIN ADULT PO), Take 1 tablet by mouth, Disp: , Rfl:     Wegovy 1.7 MG/0.75ML, Inject 1.7 mg under the skin Once a week, Disp: , Rfl:     traMADol  "(ULTRAM) 50 mg tablet, Take 50 mg by mouth every 6 (six) hours as needed, Disp: , Rfl:     Allergies   Allergen Reactions    Lactose - Food Allergy Diarrhea     Abdominal bloating    Benzoyl Peroxide Hives, Itching and Rash    Povidone Iodine Hives, Itching and Rash       Physical Exam:    Ht 5' 3\" (1.6 m)   Wt 65.3 kg (144 lb)   BMI 25.51 kg/m²     Constitutional: normal, well developed, well nourished, alert, in no distress and non-toxic and no overt pain behavior.  Eyes: anicteric  HEENT: grossly intact  Neck: supple, symmetric, trachea midline and no masses   Pulmonary:even and unlabored  Cardiovascular:No edema or pitting edema present  Skin:Normal without rashes or lesions and well hydrated  Psychiatric:Mood and affect appropriate  Neurologic:Cranial Nerves II-XII grossly intact  Musculoskeletal:antalgic; pain with palpation of the coccyx    Imaging    Impression    Impression: Normal MR examination of the sacrum/coccyx.          Workstation:[id number]  Narrative    History: M 53.3    Technique: Small field-of-view thin section coronal T1, sagittal T1, axial T1,  axial T2, axial fat-suppressed T2, and coronal STIR sequences of the sacrum were  obtained. There are no pertinent prior studies.      Previous x-ray 7/1/2024 and lumbar MRI 12/16/2022      Results:  The bone marrow signal intensity is normal. The sacroiliac joints appear normal  bilaterally. There is no bone marrow edema. There is no bone marrow  infiltration. There is no fracture. .     There is evidence of mass, fluid, inflammatory change, or lymphadenopathy. The  adjacent neurovascular structures appear normal. No presacral mass nor cyst  there is been hysterectomy.    Impression    Findings/impression: Overlying stool and gas limits evaluation.  No acute fracture or malalignment. No lytic or blastic lesions.            Workstation:[id number]  Narrative    History: Coccyx pain    Study: XR SACRUM AND/OR COCCYX    Comparison: None  Exam End: 07/01/24 " 12:32 PM    Specimen Collected: 07/01/24  1:50 PM Last Resulted: 07/01/24  1:50 PM   Received From: [facility]  Result Received: 10/16/24 12:55 PM           Orders Placed This Encounter   Procedures    [place] spine and pain procedure

## 2025-01-09 NOTE — PROGRESS NOTES
Assessment:  1. Coccyx pain    2. Pain in the coccyx        Plan:  Patient is a 39-year-old female complains of tailbone pain for chronic pain syndrome secondary to coccydynia presents to office for follow-up visit.  MRI sacrum within normal limits  {Oral Swab Statement:73708}    {Opioid Statement:35802}    {UDS Statement:80576}    {PDMP Statement:38144}    {Pain Management Procedure Statement:95413}      History of Present Illness:    The patient is a 39 y.o. female who presents for consultation in regards to Back Pain.  Symptoms have been present for 7 months. Symptoms began without any precipitating injury or trauma. Pain is reported to be 10 on the numeric rating scale.  Symptoms are felt nearly constantly and worst in the no typical pattern.  Symptoms are characterized as burning, shooting, sharp, and cutting.  Symptoms are associated with no weakness.  Aggravating factors include bending, leaning forward, leaning bckward, sitting, exercise, and relaxation.  Relieving factors include lying down.  No change in symptoms with kneeling, standing, walking, coughing/sneezing, and bowel movements.  Treatments that have been helpful include nothing. prior injections including ***, physical therapy, chiropractic manipulation, home exercise, and heat/ice have provided no relief.  Medications to relieve symptoms include ibuprofen.    Review of Systems:    Review of Systems   Constitutional:  Negative for chills and fever.   HENT:  Negative for ear pain and sore throat.    Eyes:  Negative for pain and visual disturbance.   Respiratory:  Negative for cough and shortness of breath.    Cardiovascular:  Negative for chest pain and palpitations.   Gastrointestinal:  Negative for abdominal pain and vomiting.   Genitourinary:  Negative for dysuria and hematuria.   Musculoskeletal:  Positive for arthralgias and myalgias. Negative for back pain.   Skin:  Negative for color change and rash.   Neurological:  Negative for seizures and  "syncope.   All other systems reviewed and are negative.        History reviewed. No pertinent past medical history.    History reviewed. No pertinent surgical history.    History reviewed. No pertinent family history.    Social History     Occupational History    Not on file   Tobacco Use    Smoking status: Every Day     Types: Cigarettes    Smokeless tobacco: Never   Substance and Sexual Activity    Alcohol use: Not on file    Drug use: Not on file    Sexual activity: Not on file         Current Outpatient Medications:     celecoxib (CeleBREX) 200 mg capsule, Take 200 mg by mouth daily, Disp: , Rfl:     Ibuprofen (Advil) 200 MG CAPS, Take by mouth, Disp: , Rfl:     lansoprazole (PREVACID) 30 mg capsule, Take 30 mg by mouth daily, Disp: , Rfl:     Multiple Vitamin (MULTIVITAMIN ADULT PO), Take 1 tablet by mouth, Disp: , Rfl:     Wegovy 1.7 MG/0.75ML, Inject 1.7 mg under the skin Once a week, Disp: , Rfl:     traMADol (ULTRAM) 50 mg tablet, Take 50 mg by mouth every 6 (six) hours as needed, Disp: , Rfl:     Allergies   Allergen Reactions    Lactose - Food Allergy Diarrhea     Abdominal bloating    Benzoyl Peroxide Hives, Itching and Rash    Povidone Iodine Hives, Itching and Rash       Physical Exam:    Ht 5' 3\" (1.6 m)   Wt 65.3 kg (144 lb)   BMI 25.51 kg/m²     Constitutional: normal, well developed, well nourished, alert, in no distress and non-toxic and no overt pain behavior.  Eyes: anicteric  HEENT: grossly intact  Neck: supple, symmetric, trachea midline and no masses   Pulmonary:even and unlabored  Cardiovascular:No edema or pitting edema present  Skin:Normal without rashes or lesions and well hydrated  Psychiatric:Mood and affect appropriate  Neurologic:Cranial Nerves II-XII grossly intact  Musculoskeletal:antalgic    Imaging    Impression    Impression: Normal MR examination of the sacrum/coccyx.          Workstation:[id number]  Narrative    History: M 53.3    Technique: Small field-of-view thin section " coronal T1, sagittal T1, axial T1,  axial T2, axial fat-suppressed T2, and coronal STIR sequences of the sacrum were  obtained. There are no pertinent prior studies.      Previous x-ray 7/1/2024 and lumbar MRI 12/16/2022      Results:  The bone marrow signal intensity is normal. The sacroiliac joints appear normal  bilaterally. There is no bone marrow edema. There is no bone marrow  infiltration. There is no fracture. .     There is evidence of mass, fluid, inflammatory change, or lymphadenopathy. The  adjacent neurovascular structures appear normal. No presacral mass nor cyst  there is been hysterectomy.    Impression    Findings/impression: Overlying stool and gas limits evaluation.  No acute fracture or malalignment. No lytic or blastic lesions.            Workstation:[id number]  Narrative    History: Coccyx pain    Study: XR SACRUM AND/OR COCCYX    Comparison: None  Exam End: 07/01/24 12:32 PM    Specimen Collected: 07/01/24  1:50 PM Last Resulted: 07/01/24  1:50 PM   Received From: [facility] [address]  Result Received: 10/16/24 12:55 PM           No orders of the defined types were placed in this encounter.

## 2025-02-04 ENCOUNTER — HOSPITAL ENCOUNTER (OUTPATIENT)
Dept: RADIOLOGY | Facility: HOSPITAL | Age: 40
Discharge: HOME | End: 2025-02-04
Payer: COMMERCIAL

## 2025-02-04 VITALS
HEART RATE: 80 BPM | TEMPERATURE: 98.8 F | RESPIRATION RATE: 18 BRPM | DIASTOLIC BLOOD PRESSURE: 73 MMHG | OXYGEN SATURATION: 100 % | SYSTOLIC BLOOD PRESSURE: 108 MMHG

## 2025-02-04 DIAGNOSIS — M53.3 PAIN IN THE COCCYX: ICD-10-CM

## 2025-02-04 DIAGNOSIS — M53.3 COCCYX PAIN: ICD-10-CM

## 2025-02-04 PROCEDURE — 77002 NEEDLE LOCALIZATION BY XRAY: CPT | Performed by: ANESTHESIOLOGY

## 2025-02-04 PROCEDURE — 20550 NJX 1 TENDON SHEATH/LIGAMENT: CPT | Performed by: ANESTHESIOLOGY

## 2025-02-04 PROCEDURE — 20600 DRAIN/INJ JOINT/BURSA W/O US: CPT | Performed by: ANESTHESIOLOGY

## 2025-02-04 PROCEDURE — 77002 NEEDLE LOCALIZATION BY XRAY: CPT

## 2025-02-04 RX ORDER — METHYLPREDNISOLONE ACETATE 40 MG/ML
40 INJECTION, SUSPENSION INTRA-ARTICULAR; INTRALESIONAL; INTRAMUSCULAR; PARENTERAL; SOFT TISSUE ONCE
Status: COMPLETED | OUTPATIENT
Start: 2025-02-04 | End: 2025-02-04

## 2025-02-04 RX ORDER — LIDOCAINE HYDROCHLORIDE 10 MG/ML
2 INJECTION, SOLUTION EPIDURAL; INFILTRATION; INTRACAUDAL; PERINEURAL ONCE
Status: COMPLETED | OUTPATIENT
Start: 2025-02-04 | End: 2025-02-04

## 2025-02-04 RX ORDER — BUPIVACAINE HCL/PF 2.5 MG/ML
2 VIAL (ML) INJECTION ONCE
Status: COMPLETED | OUTPATIENT
Start: 2025-02-04 | End: 2025-02-04

## 2025-02-04 RX ORDER — ROPIVACAINE HYDROCHLORIDE 2 MG/ML
1 INJECTION, SOLUTION EPIDURAL; INFILTRATION; PERINEURAL ONCE
Status: DISCONTINUED | OUTPATIENT
Start: 2025-02-04 | End: 2025-02-04

## 2025-02-04 RX ADMIN — METHYLPREDNISOLONE ACETATE 40 MG: 40 INJECTION, SUSPENSION INTRA-ARTICULAR; INTRALESIONAL; INTRAMUSCULAR; PARENTERAL; SOFT TISSUE at 09:33

## 2025-02-04 RX ADMIN — IOHEXOL 1 ML: 300 INJECTION, SOLUTION INTRAVENOUS at 09:32

## 2025-02-04 RX ADMIN — LIDOCAINE HYDROCHLORIDE 2 ML: 10 INJECTION, SOLUTION EPIDURAL; INFILTRATION; INTRACAUDAL; PERINEURAL at 09:30

## 2025-02-04 RX ADMIN — Medication 2 ML: at 09:33

## 2025-02-04 NOTE — INTERVAL H&P NOTE
Update: (This section must be completed if the H&P was completed greater than 24 hrs to procedure or admission)    H&P reviewed. After examining the patient, I find no changed to the H&P since it had been written.    Patient re-evaluated. Accept as history and physical.    Joshua Upton MD/February 4, 2025/9:05 AM

## 2025-02-04 NOTE — DISCHARGE INSTR - LAB

## 2025-02-18 ENCOUNTER — TELEPHONE (OUTPATIENT)
Dept: PAIN MEDICINE | Facility: CLINIC | Age: 40
End: 2025-02-18

## 2025-03-11 ENCOUNTER — OFFICE VISIT (OUTPATIENT)
Age: 40
End: 2025-03-11
Payer: COMMERCIAL

## 2025-03-11 VITALS — HEIGHT: 63 IN | WEIGHT: 140 LBS | BODY MASS INDEX: 24.8 KG/M2

## 2025-03-11 DIAGNOSIS — M47.816 LUMBAR SPONDYLOSIS: ICD-10-CM

## 2025-03-11 DIAGNOSIS — G89.4 CHRONIC PAIN SYNDROME: ICD-10-CM

## 2025-03-11 DIAGNOSIS — M53.3 COCCYX PAIN: Primary | ICD-10-CM

## 2025-03-11 PROCEDURE — 99213 OFFICE O/P EST LOW 20 MIN: CPT | Performed by: ANESTHESIOLOGY

## 2025-03-11 NOTE — PROGRESS NOTES
Assessment:  1. Coccyx pain    2. Lumbar spondylosis    3. Chronic pain syndrome        Plan:  Patient is a 40-year-old female complains of sacral pain with chronic pain syndrome secondary to coccydynia presents to office for follow-up visit.  Patient's status post coccygeal ligament and sacrococcygeal joint steroid injection to which she reported only 3 hours of relief.  The relief and with the numbing of the medication subsided.  At this time patient will seek surgical intervention.  1.  Follow-up as needed        History of Present Illness:  The patient is a 40 y.o. female who presents for a follow up office visit in regards to Tailbone Pain.   The patient’s current symptoms include 10 out of 10 sharp, stabbing pain without a particular time pattern.    Current pain medications includes: none     I have personally reviewed and/or updated the patient's past medical history, past surgical history, family history, social history, current medications, allergies, and vital signs today.         Review of Systems  Review of Systems   Constitutional:  Negative for unexpected weight change.   HENT:  Negative for hearing loss.    Eyes:  Negative for visual disturbance.   Respiratory:  Negative for shortness of breath.    Cardiovascular:  Negative for leg swelling.   Gastrointestinal:  Negative for constipation.   Endocrine: Negative for polyuria.   Genitourinary:  Negative for difficulty urinating.   Musculoskeletal:  Negative for gait problem, joint swelling and myalgias.        Joint stiffness  Pain in extremity- tailbone   Skin:  Negative for rash.   Neurological:  Negative for weakness and headaches.   Psychiatric/Behavioral:  Negative for decreased concentration.    All other systems reviewed and are negative.      History reviewed. No pertinent past medical history.    History reviewed. No pertinent surgical history.    History reviewed. No pertinent family history.    Social History     Occupational History    Not  "on file   Tobacco Use    Smoking status: Every Day     Current packs/day: 0.25     Average packs/day: 0.3 packs/day for 20.2 years (5.0 ttl pk-yrs)     Types: Cigarettes     Start date: 2005    Smokeless tobacco: Never   Vaping Use    Vaping status: Never Used   Substance and Sexual Activity    Alcohol use: Yes     Comment: social    Drug use: Yes     Types: Marijuana     Comment: 1-2x/wk    Sexual activity: Not on file         Current Outpatient Medications:     celecoxib (CeleBREX) 200 mg capsule, Take 200 mg by mouth daily, Disp: , Rfl:     Ibuprofen (Advil) 200 MG CAPS, Take by mouth, Disp: , Rfl:     lansoprazole (PREVACID) 30 mg capsule, Take 30 mg by mouth daily, Disp: , Rfl:     Multiple Vitamin (MULTIVITAMIN ADULT PO), Take 1 tablet by mouth, Disp: , Rfl:     Wegovy 1.7 MG/0.75ML, Inject 1.7 mg under the skin Once a week, Disp: , Rfl:     Allergies   Allergen Reactions    Lactose - Food Allergy Diarrhea     Abdominal bloating    Benzoyl Peroxide Hives, Itching and Rash    Povidone Iodine Hives, Itching and Rash       Physical Exam:    Ht 5' 3\" (1.6 m)   Wt 63.5 kg (140 lb) Comment: verbal per patient  BMI 24.80 kg/m²     Constitutional:normal, well developed, well nourished, alert, in no distress and non-toxic and no overt pain behavior.  Eyes:anicteric  HEENT:grossly intact  Neck:supple, symmetric, trachea midline and no masses   Pulmonary:even and unlabored  Cardiovascular:No edema or pitting edema present  Skin:Normal without rashes or lesions and well hydrated  Psychiatric:Mood and affect appropriate  Neurologic:Cranial Nerves II-XII grossly intact  Musculoskeletal:antalgic    Imaging  No orders to display       No orders of the defined types were placed in this encounter.        "

## 2025-03-11 NOTE — PATIENT INSTRUCTIONS
Patient Education     Core Strengthening Exercises on Back or on Hands and Knees   About this topic   Your core muscles are in your chest, back, buttock, and stomach area. They are your abdominal, back, and pelvis muscles. These muscles help keep your body stable when using your arms or legs. They also help with balance and posture. There are many exercises you can do to keep these muscles strong.  If you have back problems like a compression fracture or a ruptured disc, doing some of these exercises could make your problem worse. Some of these exercises may cause lower back pain.  General   Before starting with a program, ask your doctor if you are healthy enough to do these exercises. Your doctor may have you work with a , chiropractor, or physical therapist to make a safe exercise program to meet your needs.  Strengthening Exercises   Strengthening exercises keep your muscles firm and strong. Start by repeating each exercise 2 to 3 times. Work up to doing each exercise 10 times. Try to do the exercises 2 to 3 times each day. Hold each exercise for 3 to 5 seconds. Do all exercises slowly.  Hip lifts ? Lie on your back with your knees bent and feet flat on the floor. Tighten your stomach muscles and push your heels into the floor to lift your buttocks off the floor. Relax.  Pelvic tilts ? Lie on your back with your knees bent and feet flat on the floor. Tighten your stomach muscles and press your lower back down to the floor. Relax.  Straight leg raises lying down ? Lie on your back with one leg straight. Bend your other knee so the foot is flat on the bed. Keeping your leg straight, lift the leg up to the level of your other knee. Lower it back down. Repeat with the other leg.  Knee flex lying down ? Lie on your back with both knees bent and your feet flat on the floor. Tighten your belly muscles. Raise one leg up and back down as if you are marching in slow motion. Keep belly muscles tight while you move  your leg. Switch legs. To make this exercise harder, raise both arms straight up in the air. Tighten your belly muscles. When you raise one leg up, reach the opposite arm over your head. Switch, moving the opposite arm and leg until you have done 10 repetitions on each side.  Abdominal crunches ? Lie on your back with both knees bent. Keep your feet flat on the floor. Place your hands in one of these positions. Try starting with the first position since it is the easiest. As you get better, use the other positions to make it harder.  Crunches with arms at sides.  Crunches with arms across chest.  Crunches with arms behind head. Be careful not to interlock your fingers behind your neck or head while doing crunches. This may add tension to your neck and cause strain.  Look at the ceiling. Tighten your belly muscles and lift your shoulders and upper back off the floor. Breathe out while you are doing this. Lower your shoulders to the floor. Breathe in while you are doing this. Relax your belly muscles all the way before starting another crunch.  Arm and leg lifts on hands and knees ? Start on your hands and knees. With all of these exercises, keep your back as level as possible. If you are having trouble with this, you may want to put a small object on your back such as a book. If it falls off, you are not keeping your back level enough during the exercise.  Lift one arm up to shoulder level and hold. Lower it back down. Now, lift up the other arm and hold.  Lift one leg up and kick it straight out until it is in line with your back and hold. Lower it back down. Now, lift up the other leg and hold.  Lift one arm and the OPPOSITE leg up at the same time and hold. Lower them down. Now, repeat using the other arm and leg. This is a very hard exercise. It may take time to be able to do this.               What will the results be?   Stronger core  Better balance  More toned belly and back muscles  Easier to do daily  activities  Better sports performance  Less low back pain  Helpful tips   Stay active and work out to keep your muscles strong and flexible.  Keep a healthy weight to avoid putting too much stress on your spine. Eat a healthy diet to keep your muscles healthy.  Be sure you do not hold your breath when exercising. This can raise your blood pressure. If you tend to hold your breath, try counting out loud when exercising. If any exercise bothers you, stop right away.  Try walking or cycling at an easy pace for a few minutes to warm up your muscles. Do this again after exercising.  Exercise may be slightly uncomfortable, but you should not have sharp pains. If you do get sharp pains, stop what you are doing. If the sharp pains continue, call your doctor.  Last Reviewed Date   2021-03-18  Consumer Information Use and Disclaimer   This generalized information is a limited summary of diagnosis, treatment, and/or medication information. It is not meant to be comprehensive and should be used as a tool to help the user understand and/or assess potential diagnostic and treatment options. It does NOT include all information about conditions, treatments, medications, side effects, or risks that may apply to a specific patient. It is not intended to be medical advice or a substitute for the medical advice, diagnosis, or treatment of a health care provider based on the health care provider's examination and assessment of a patient’s specific and unique circumstances. Patients must speak with a health care provider for complete information about their health, medical questions, and treatment options, including any risks or benefits regarding use of medications. This information does not endorse any treatments or medications as safe, effective, or approved for treating a specific patient. [employer] and its affiliates disclaim any warranty or liability relating to this information or the use thereof. The use of this information  is governed by the Terms of Use, available at https://www.woltersClothiauwer.com/en/know/clinical-effectiveness-terms   Copyright   Copyright © 2024 [employer] and its affiliates and/or licensors. All rights reserved.

## 2025-06-20 ENCOUNTER — PATIENT MESSAGE (OUTPATIENT)
Age: 40
End: 2025-06-20

## 2025-06-30 ENCOUNTER — TELEPHONE (OUTPATIENT)
Dept: PAIN MEDICINE | Facility: CLINIC | Age: 40
End: 2025-06-30

## 2025-07-11 ENCOUNTER — HOSPITAL ENCOUNTER (OUTPATIENT)
Dept: RADIOLOGY | Facility: HOSPITAL | Age: 40
End: 2025-07-11
Attending: ANESTHESIOLOGY
Payer: COMMERCIAL

## 2025-07-11 VITALS
SYSTOLIC BLOOD PRESSURE: 111 MMHG | TEMPERATURE: 98 F | HEART RATE: 89 BPM | RESPIRATION RATE: 20 BRPM | OXYGEN SATURATION: 99 % | DIASTOLIC BLOOD PRESSURE: 73 MMHG

## 2025-07-11 DIAGNOSIS — M53.3 COCCYX PAIN: ICD-10-CM

## 2025-07-11 PROCEDURE — 77002 NEEDLE LOCALIZATION BY XRAY: CPT

## 2025-07-11 PROCEDURE — 62323 NJX INTERLAMINAR LMBR/SAC: CPT | Performed by: ANESTHESIOLOGY

## 2025-07-11 PROCEDURE — 20550 NJX 1 TENDON SHEATH/LIGAMENT: CPT | Performed by: ANESTHESIOLOGY

## 2025-07-11 RX ORDER — LIDOCAINE HYDROCHLORIDE 10 MG/ML
2 INJECTION, SOLUTION EPIDURAL; INFILTRATION; INTRACAUDAL; PERINEURAL ONCE
Status: COMPLETED | OUTPATIENT
Start: 2025-07-11 | End: 2025-07-11

## 2025-07-11 RX ORDER — ROPIVACAINE HYDROCHLORIDE 2 MG/ML
1 INJECTION, SOLUTION EPIDURAL; INFILTRATION; PERINEURAL ONCE
Status: COMPLETED | OUTPATIENT
Start: 2025-07-11 | End: 2025-07-11

## 2025-07-11 RX ORDER — METHYLPREDNISOLONE ACETATE 40 MG/ML
40 INJECTION, SUSPENSION INTRA-ARTICULAR; INTRALESIONAL; INTRAMUSCULAR; PARENTERAL; SOFT TISSUE ONCE
Status: COMPLETED | OUTPATIENT
Start: 2025-07-11 | End: 2025-07-11

## 2025-07-11 RX ADMIN — LIDOCAINE HYDROCHLORIDE 2 ML: 10 INJECTION, SOLUTION EPIDURAL; INFILTRATION; INTRACAUDAL; PERINEURAL at 13:14

## 2025-07-11 RX ADMIN — METHYLPREDNISOLONE ACETATE 40 MG: 40 INJECTION, SUSPENSION INTRA-ARTICULAR; INTRALESIONAL; INTRAMUSCULAR; PARENTERAL; SOFT TISSUE at 13:15

## 2025-07-11 RX ADMIN — ROPIVACAINE HYDROCHLORIDE 1 ML: 2 INJECTION, SOLUTION EPIDURAL; INFILTRATION; PERINEURAL at 13:15

## 2025-07-11 RX ADMIN — IOHEXOL 1 ML: 300 INJECTION, SOLUTION INTRAVENOUS at 13:15

## 2025-07-11 NOTE — H&P
Assessment:  1. Coccyx pain  FL spine and pain procedure    FL spine and pain procedure          Plan:  Rimma Leyva is a 40 y.o. female with complaints of coccyx pain presents to surgical center for procedure.  We will perform a coccygeal ligament and sacrococcygeal joint steroid injection  2. Follow-up 1 month after injection    Complete risks and benefits including bleeding, infection, tissue reaction, nerve injury and allergic reaction were discussed. The approach was demonstrated using models and literature was provided. Verbal and written consent was obtained.    My impressions and treatment recommendations were discussed in detail with the patient who verbalized understanding and had no further questions.  Discharge instructions were provided. I personally saw and examined the patient and I agree with the above discussed plan of care.    Orders Placed This Encounter   Procedures    FL spine and pain procedure     Standing Status:   Standing     Number of Occurrences:   1     Reason for Exam::   coccygeal ligament and coccygeal joint injection     Is the patient pregnant?:   No     Is an anticoagulant hold needed?:   no     New Medications Ordered This Visit   Medications    iohexol (OMNIPAQUE) 300 mg/mL injection 1 mL    lidocaine (PF) (XYLOCAINE-MPF) 1 % injection 2 mL    methylPREDNISolone acetate (DEPO-MEDROL) injection 40 mg    ropivacaine (NAROPIN) injection 1 mL       History of Present Illness:  Rimma Leyva is a 40 y.o. female who presents for a follow up office visit in regards to coccyx pain.   The patient’s current symptoms include 8/10 constant sharp and stabbing throbbing pain with no particular time pattern.      I have personally reviewed and/or updated the patient's past medical history, past surgical history, family history, social history, current medications, allergies, and vital signs today.     Review of Systems   Musculoskeletal:  Positive for arthralgias and back pain.   All  other systems reviewed and are negative.      Current Outpatient Medications on File Prior to Encounter   Medication Sig    celecoxib (CeleBREX) 200 mg capsule Take 200 mg by mouth daily    Ibuprofen (Advil) 200 MG CAPS Take by mouth    lansoprazole (PREVACID) 30 mg capsule Take 30 mg by mouth daily    Multiple Vitamin (MULTIVITAMIN ADULT PO) Take 1 tablet by mouth    Wegovy 1.7 MG/0.75ML Inject 1.7 mg under the skin Once a week     No current facility-administered medications on file prior to encounter.       Allergies[1]    Physical Exam:    /73 (BP Location: Right arm)   Pulse 89   Temp 98 °F (36.7 °C) (Tympanic)   Resp 20   SpO2 99%     Constitutional:normal, well developed, well nourished, alert, in no distress and non-toxic and no overt pain behavior.  Eyes:anicteric  HEENT:grossly intact  Neck:supple, symmetric, trachea midline and no masses   Pulmonary:even and unlabored  Cardiovascular:No edema or pitting edema present  Skin:Normal without rashes or lesions and well hydrated  Psychiatric:Mood and affect appropriate  Neurologic:Cranial Nerves II-XII grossly intact  Musculoskeletal:antalgic             [1]   Allergies  Allergen Reactions    Lactose - Food Allergy Diarrhea     Abdominal bloating    Benzoyl Peroxide Hives, Itching and Rash    Povidone Iodine Hives, Itching and Rash      appears normal and intact appears normal and intact

## 2025-07-11 NOTE — DISCHARGE INSTR - LAB

## 2025-07-22 ENCOUNTER — OFFICE VISIT (OUTPATIENT)
Age: 40
End: 2025-07-22
Payer: COMMERCIAL

## 2025-07-22 ENCOUNTER — HOSPITAL ENCOUNTER (OUTPATIENT)
Dept: RADIOLOGY | Facility: HOSPITAL | Age: 40
Discharge: HOME | End: 2025-07-22
Payer: COMMERCIAL

## 2025-07-22 VITALS — BODY MASS INDEX: 24.2 KG/M2 | HEIGHT: 63 IN | WEIGHT: 136.6 LBS

## 2025-07-22 DIAGNOSIS — G89.4 CHRONIC PAIN SYNDROME: ICD-10-CM

## 2025-07-22 DIAGNOSIS — M51.16 LUMBAR DISC DISEASE WITH RADICULOPATHY: ICD-10-CM

## 2025-07-22 DIAGNOSIS — M79.18 MYOFASCIAL PAIN SYNDROME: ICD-10-CM

## 2025-07-22 DIAGNOSIS — M54.16 LUMBAR RADICULOPATHY: ICD-10-CM

## 2025-07-22 DIAGNOSIS — M54.16 LUMBAR RADICULOPATHY: Primary | ICD-10-CM

## 2025-07-22 PROCEDURE — 72110 X-RAY EXAM L-2 SPINE 4/>VWS: CPT

## 2025-07-22 PROCEDURE — 99214 OFFICE O/P EST MOD 30 MIN: CPT | Performed by: ANESTHESIOLOGY

## 2025-07-22 NOTE — PATIENT INSTRUCTIONS
Patient Education     Core Strengthening Exercises on Back or on Hands and Knees   About this topic   Your core muscles are in your chest, back, buttock, and stomach area. They are your abdominal, back, and pelvis muscles. These muscles help keep your body stable when using your arms or legs. They also help with balance and posture. There are many exercises you can do to keep these muscles strong.  If you have back problems like a compression fracture or a ruptured disc, doing some of these exercises could make your problem worse. Some of these exercises may cause lower back pain.  General   Before starting with a program, ask your doctor if you are healthy enough to do these exercises. Your doctor may have you work with a , chiropractor, or physical therapist to make a safe exercise program to meet your needs.  Strengthening Exercises   Strengthening exercises keep your muscles firm and strong. Start by repeating each exercise 2 to 3 times. Work up to doing each exercise 10 times. Try to do the exercises 2 to 3 times each day. Hold each exercise for 3 to 5 seconds. Do all exercises slowly.  Hip lifts ? Lie on your back with your knees bent and feet flat on the floor. Tighten your stomach muscles and push your heels into the floor to lift your buttocks off the floor. Relax.  Pelvic tilts ? Lie on your back with your knees bent and feet flat on the floor. Tighten your stomach muscles and press your lower back down to the floor. Relax.  Straight leg raises lying down ? Lie on your back with one leg straight. Bend your other knee so the foot is flat on the bed. Keeping your leg straight, lift the leg up to the level of your other knee. Lower it back down. Repeat with the other leg.  Knee flex lying down ? Lie on your back with both knees bent and your feet flat on the floor. Tighten your belly muscles. Raise one leg up and back down as if you are marching in slow motion. Keep belly muscles tight while you move  your leg. Switch legs. To make this exercise harder, raise both arms straight up in the air. Tighten your belly muscles. When you raise one leg up, reach the opposite arm over your head. Switch, moving the opposite arm and leg until you have done 10 repetitions on each side.  Abdominal crunches ? Lie on your back with both knees bent. Keep your feet flat on the floor. Place your hands in one of these positions. Try starting with the first position since it is the easiest. As you get better, use the other positions to make it harder.  Crunches with arms at sides.  Crunches with arms across chest.  Crunches with arms behind head. Be careful not to interlock your fingers behind your neck or head while doing crunches. This may add tension to your neck and cause strain.  Look at the ceiling. Tighten your belly muscles and lift your shoulders and upper back off the floor. Breathe out while you are doing this. Lower your shoulders to the floor. Breathe in while you are doing this. Relax your belly muscles all the way before starting another crunch.  Arm and leg lifts on hands and knees ? Start on your hands and knees. With all of these exercises, keep your back as level as possible. If you are having trouble with this, you may want to put a small object on your back such as a book. If it falls off, you are not keeping your back level enough during the exercise.  Lift one arm up to shoulder level and hold. Lower it back down. Now, lift up the other arm and hold.  Lift one leg up and kick it straight out until it is in line with your back and hold. Lower it back down. Now, lift up the other leg and hold.  Lift one arm and the OPPOSITE leg up at the same time and hold. Lower them down. Now, repeat using the other arm and leg. This is a very hard exercise. It may take time to be able to do this.               What will the results be?   Stronger core  Better balance  More toned belly and back muscles  Easier to do daily  activities  Better sports performance  Less low back pain  Helpful tips   Stay active and work out to keep your muscles strong and flexible.  Keep a healthy weight to avoid putting too much stress on your spine. Eat a healthy diet to keep your muscles healthy.  Be sure you do not hold your breath when exercising. This can raise your blood pressure. If you tend to hold your breath, try counting out loud when exercising. If any exercise bothers you, stop right away.  Try walking or cycling at an easy pace for a few minutes to warm up your muscles. Do this again after exercising.  Exercise may be slightly uncomfortable, but you should not have sharp pains. If you do get sharp pains, stop what you are doing. If the sharp pains continue, call your doctor.  Last Reviewed Date   2021-03-18  Consumer Information Use and Disclaimer   This generalized information is a limited summary of diagnosis, treatment, and/or medication information. It is not meant to be comprehensive and should be used as a tool to help the user understand and/or assess potential diagnostic and treatment options. It does NOT include all information about conditions, treatments, medications, side effects, or risks that may apply to a specific patient. It is not intended to be medical advice or a substitute for the medical advice, diagnosis, or treatment of a health care provider based on the health care provider's examination and assessment of a patient’s specific and unique circumstances. Patients must speak with a health care provider for complete information about their health, medical questions, and treatment options, including any risks or benefits regarding use of medications. This information does not endorse any treatments or medications as safe, effective, or approved for treating a specific patient. [employer] and its affiliates disclaim any warranty or liability relating to this information or the use thereof. The use of this information  is governed by the Terms of Use, available at https://www.[employer].com/en/know/clinical-effectiveness-terms   Copyright   Copyright © 2024 [employer] and its affiliates and/or licensors. All rights reserved.     n/a

## 2025-07-22 NOTE — PROGRESS NOTES
Name: Rimma Leyva      : 1985      MRN: [id number]  Encounter Provider: Joshua Upton MD  Encounter Date: 2025   Encounter department: [facility]  :  Assessment & Plan    Patient is a 40-year-old female complains of low back pain and left hip pain with radiating pain into the left lower extremity which appears to be in the L3 and L4 nerve root distribution with a history significant for spinal canal stenosis, foraminal narrowing,'s lumbar spondylosis presents to office for follow-up visit.  Patient has just undergone a sacrococcygeal joint steroid injection.  Patient has not had any images of her lumbar spine since .  Patient does have a history of multiple epidural steroid injections in addition to a PRP L4-L5 disc injection.  Patient has been doing chiropractic manipulation of the lumbar spine for greater than 6 weeks twice a week without any significant alleviation of symptoms.  1.  We will order an x-ray of the lumbar spine to better assess any degenerative changes or correlate patient current presentation.  2.  We will order an MRI of the lumbar spine to better assess the discogenic pathology by patient's low back pain and left lower extremity Dickler symptoms  3.  Follow-up in 1 month    My impressions and treatment recommendations were discussed in detail with the patient who verbalized understanding and had no further questions.  Discharge instructions were provided. I personally saw and examined the patient and I agree with the above discussed plan of care.    History of Present Illness     Rimma Leyva is a 40 y.o. female who presents for a follow up office visit in regards to Hip Pain. The patient’s current symptoms include 4 out of 10 sharp, stabbing, throbbing, cramping, burning pain left lower EXTR without particular time pattern.    Current pain medications includes:  None.    Review of Systems   Constitutional:  Negative for unexpected  "weight change.   HENT:  Negative for hearing loss.    Eyes:  Negative for visual disturbance.   Respiratory:  Negative for shortness of breath.    Cardiovascular:  Negative for leg swelling.   Gastrointestinal:  Negative for constipation.   Endocrine: Negative for polyuria.   Genitourinary:  Negative for difficulty urinating.   Musculoskeletal:  Negative for gait problem, joint swelling and myalgias.        Joint stiffness   Skin:  Negative for rash.   Neurological:  Negative for weakness and headaches.   Psychiatric/Behavioral:  Negative for decreased concentration.    All other systems reviewed and are negative.    Medical History Reviewed by provider this encounter:     .       Objective   Ht 5' 3\" (1.6 m)   Wt 62 kg (136 lb 9.6 oz)   BMI 24.20 kg/m²         Physical Exam  Constitutional: normal, well developed, well nourished, alert, in no distress and non-toxic and no overt pain behavior.  Eyes: anicteric  HEENT: grossly intact  Neck: supple, symmetric, trachea midline and no masses   Pulmonary: even and unlabored  Cardiovascular: No edema or pitting edema present  Skin: Normal without rashes or lesions and well hydrated  Psychiatric: Mood and affect appropriate  Neurologic: Cranial Nerves II-XII grossly intact  Musculoskeletal: antalgic    Lumbar/Sacral Spine examination demonstrates.  Decreased range of motion lumbar spine with pain upon: flexion, lateral rotation to the left/right, and bending to the left/right.  Bilateral lumbar paraspinals tender to palpation. Muscle spasms noted in the lumbar area bilaterally. 4/5 lower extremity strength in all muscle groups bilaterally. Positive seated straight leg raise for bilateral lower extremities.  Sensitivity to light touch intact bilateral lower extremities. 2+ reflexes in the patella and Achilles.  No ankle clonus      "

## 2025-07-24 ENCOUNTER — TELEPHONE (OUTPATIENT)
Age: 40
End: 2025-07-24

## 2025-07-24 NOTE — TELEPHONE ENCOUNTER
Caller: Patient     Doctor: Dr. Upton     Reason for call: Patient will be faxing her chiro notes to: [phone]     Please call patient to confirm once received     Call back#: [phone]

## 2025-07-25 ENCOUNTER — TELEPHONE (OUTPATIENT)
Dept: PAIN MEDICINE | Facility: CLINIC | Age: 40
End: 2025-07-25

## 2025-08-04 ENCOUNTER — HOSPITAL ENCOUNTER (OUTPATIENT)
Dept: MRI IMAGING | Facility: HOSPITAL | Age: 40
Discharge: HOME | End: 2025-08-04
Attending: ANESTHESIOLOGY
Payer: COMMERCIAL

## 2025-08-04 DIAGNOSIS — G89.4 CHRONIC PAIN SYNDROME: ICD-10-CM

## 2025-08-04 DIAGNOSIS — M54.16 LUMBAR RADICULOPATHY: ICD-10-CM

## 2025-08-04 DIAGNOSIS — M51.16 LUMBAR DISC DISEASE WITH RADICULOPATHY: ICD-10-CM

## 2025-08-04 DIAGNOSIS — M79.18 MYOFASCIAL PAIN SYNDROME: ICD-10-CM

## 2025-08-04 PROCEDURE — 72148 MRI LUMBAR SPINE W/O DYE: CPT
